# Patient Record
Sex: FEMALE | Race: WHITE | NOT HISPANIC OR LATINO | Employment: FULL TIME | URBAN - METROPOLITAN AREA
[De-identification: names, ages, dates, MRNs, and addresses within clinical notes are randomized per-mention and may not be internally consistent; named-entity substitution may affect disease eponyms.]

---

## 2019-03-26 ENCOUNTER — TELEPHONE (OUTPATIENT)
Dept: PEDIATRICS | Facility: CLINIC | Age: 29
End: 2019-03-26

## 2019-03-27 NOTE — TELEPHONE ENCOUNTER
Left message for patient to return my call directly to schedule meet and greet with Yadi. [phone] Magaly Sandra TC

## 2019-03-27 NOTE — TELEPHONE ENCOUNTER
Reason for call:  Other   Patient called regarding (reason for call): appointment  Additional comments: Does NAYELY Cline do meet and greets? See the online appointment request:    Other reason for visit:  We would like to see you for a pediatric meet & greet. Our baby girl will be here soon and would like to see if you would be a good fit for our future child.    Preferred doctor/provider HERBERTH Gonzalez, APRN CN   Preferred clinic [facility]   Preferred appointment day/time:  Monday, April 1st in the morning (anytime 9:00-11:00)         Phone number to reach patient:  Cell number on file:    Telephone Information:   Mobile [phone]       Best Time:  NA    Can we leave a detailed message on this number?  Not Applicable     Juana LOREDO  Central Scheduler

## 2019-04-01 ENCOUNTER — OFFICE VISIT (OUTPATIENT)
Dept: PEDIATRICS | Facility: CLINIC | Age: 29
End: 2019-04-01
Payer: COMMERCIAL

## 2019-04-01 DIAGNOSIS — Z3A.37 37 WEEKS GESTATION OF PREGNANCY: Primary | ICD-10-CM

## 2019-04-01 PROBLEM — E66.3 OVERWEIGHT: Status: ACTIVE | Noted: 2018-01-09

## 2019-04-01 PROCEDURE — 99207 ZZC NO CHARGE LOS: CPT | Performed by: NURSE PRACTITIONER

## 2019-04-01 NOTE — PATIENT INSTRUCTIONS
[facility]- Pediatric Department    If you have any questions regarding to your visit please contact:   Team Estelle:   Clinic Hours Telephone Number   JIM Castle, TASHI Rhoades PA-C, MS Lena Pisano, KELL Sandra,    7am - 7pm Mon - Thurs  7am - 5pm Fri [phone]    After hours and weekends, call [phone]   To make an appointment at any location anytime, please call [phone] or  [employer].   Pediatric Walk-in Clinic* 8:30am - 3pm  Mon- Fri    [facility]   8:00am - 7pm  Mon- Thurs  8:00am - 5:30 pm Friday  9am - 1pm Saturday [phone]   Urgent Care - [place]      Urgent Care - [place]       11pm-9pm Monday - Friday   9am-5pm Saturday - Sunday    5pm-9pm Monday - Friday  9am-5pm Saturday - Sunday [phone] - [place]      [phone] - [place]   *Pediatric Walk-In Clinic is available for children/adolescents age 0-21 for the following symptoms:  Cough/Cold symptoms   Rashes/Itchy Skin  Sore throat    Urinary tract infection  Diarrhea    Ringworm  Ear pain    Sinus infection  Fever     Pink eye       If your provider has ordered a CT, MRI, or ultrasound for you, please call to schedule:  [facility], phone [phone]  [facility] radiology, [phone]  [facility], phone [phone]    If you need a medication refill please contact your pharmacy.   Please allow 3 business days for your refills to be completed.  **For ADHD medication, patient will need a follow up clinic or Evisit at least every 3 months to obtain refills.**    Use Eligible (secure email communication and access to your chart) to send your primary care provider a message or make an appointment.  Ask someone on your Team how to sign up for Eligible or call the Eligible help line at [phone]  To view your child's test results online: Log into your own [facility]  "account, select your child's name from the tabs on the right hand side, select \"My medical record\" and select \"Test results\"  Do you have options for a visit without coming into the clinic?  [facility] offers electronic visits (E-visits) and telephone visits for certain medical concerns as well as Zipnosis online.    E-visits via [employer]- generally incur a $45.00 fee  Telephone visits- These are billed based on time spent (in 10-minute increments) on the phone with your provider.   5-10 minutes $30.00 fee   11-20 minutes $59.00 fee   21-30 minutes $85.00 fee  Zipnosis- $25.00 fee.  More information and link available on [email or web address] homepage.     "

## 2019-04-01 NOTE — PROGRESS NOTES
SUBJECTIVE:   Natalie Barreto is a 28 year old female who presents to clinic today with self because of:    Chief Complaint   Patient presents with     Establish Care     DUE DATE  [facility]      Natalie Barreto is a 28 year old female who is due with her first child tonight.  Her pregnancy is so far complicated by pre-eclampsia and she is planning to deliver at [place].She will be induced as she has had pre-eclampsia.  Mom is on BP meds but otherwise feels fine.  Her wife is Radha.   Mom is at 37.5 weeks.  Mom is planning to breast feed.  She will try to nurse for the first year if all goes well.  She has taken a class at the hospital on breast feeding.   Questions answered regarding usual schedule of visits, how to schedule appointments, triage system for questions/concerns, and call system for concerns outside of business hours.        ASSESSMENT/PLAN:   (Z3A.37) 37 weeks gestation of pregnancy  (primary encounter diagnosis)  Comment:   Plan:   Meet and great and discussed breast feedings, well child check, after hours and clinic hours.    FOLLOW UP: after birth of baby with     Yadi Dolan, PNP, APRN CNP

## 2019-04-19 ENCOUNTER — HEALTH MAINTENANCE LETTER (OUTPATIENT)
Age: 29
End: 2019-04-19

## 2020-03-11 ENCOUNTER — HEALTH MAINTENANCE LETTER (OUTPATIENT)
Age: 30
End: 2020-03-11

## 2021-01-03 ENCOUNTER — HEALTH MAINTENANCE LETTER (OUTPATIENT)
Age: 31
End: 2021-01-03

## 2021-03-20 ENCOUNTER — OFFICE VISIT (OUTPATIENT)
Dept: URGENT CARE | Facility: URGENT CARE | Age: 31
End: 2021-03-20
Payer: COMMERCIAL

## 2021-03-20 VITALS — TEMPERATURE: 98.2 F | HEART RATE: 87 BPM | OXYGEN SATURATION: 98 % | WEIGHT: 280 LBS | RESPIRATION RATE: 16 BRPM

## 2021-03-20 DIAGNOSIS — L03.115 CELLULITIS OF RIGHT LOWER EXTREMITY: ICD-10-CM

## 2021-03-20 DIAGNOSIS — L55.9 SUNBURN: Primary | ICD-10-CM

## 2021-03-20 PROCEDURE — 99204 OFFICE O/P NEW MOD 45 MIN: CPT | Performed by: STUDENT IN AN ORGANIZED HEALTH CARE EDUCATION/TRAINING PROGRAM

## 2021-03-20 RX ORDER — MUPIROCIN 20 MG/G
OINTMENT TOPICAL 3 TIMES DAILY
Qty: 22 G | Refills: 0 | Status: SHIPPED | OUTPATIENT
Start: 2021-03-20 | End: 2023-03-15

## 2021-03-20 RX ORDER — CEPHALEXIN 500 MG/1
500 CAPSULE ORAL 4 TIMES DAILY
Qty: 28 CAPSULE | Refills: 0 | Status: SHIPPED | OUTPATIENT
Start: 2021-03-20 | End: 2021-03-27

## 2021-03-20 RX ORDER — IBUPROFEN 800 MG/1
800 TABLET, FILM COATED ORAL EVERY 8 HOURS PRN
Qty: 30 TABLET | Refills: 0 | Status: SHIPPED | OUTPATIENT
Start: 2021-03-20 | End: 2023-03-15

## 2021-03-20 RX ORDER — FLUOCINOLONE ACETONIDE 0.25 MG/G
CREAM TOPICAL 2 TIMES DAILY
Qty: 60 G | Refills: 0 | Status: SHIPPED | OUTPATIENT
Start: 2021-03-20 | End: 2021-04-03

## 2021-03-20 NOTE — PROGRESS NOTES
Assessment & Plan     Sunburn  Mild/moderate sunburn on Star 3/13 with bilateral UE, back and bilateral LE papules with surrounding erythema, itching. Tried OTC steroid cream. No blisters.   Mupirocin if blisters occur, and encouraged close follow up with PCP or return to .   Ibuprofen as needed.   Synalar cream twice daily for 2 weeks for pruritis areas  AVS on sunburns provided. Reviewed reasons to seek ED care.   Stay well hydrated.  - mupirocin (BACTROBAN) 2 % external ointment; Apply topically 3 times daily On any open sores  - ibuprofen (ADVIL/MOTRIN) 800 MG tablet; Take 1 tablet (800 mg) by mouth every 8 hours as needed for moderate pain Take with food.  - fluocinolone (SYNALAR) 0.025 % cream; Apply topically 2 times daily for 14 days Do not use on face.    Cellulitis of right lower extremity  RLE with mild induration, ttp, warmth and size increase within the past 48 hours. Likely super infected from sunburn. No allergies.  Follow up with PCP in 1 week.   Thought about SJS, fascitis, compartment syndrome, anaphylaxis - all less likely due to timeline and presenting symptoms, HDS, and exam listed below.   - cephALEXin (KEFLEX) 500 MG capsule; Take 1 capsule (500 mg) by mouth 4 times daily for 7 days    Return in about 1 week (around 3/27/2021) for in person visit.    Geovanni Abbott MD  [facility]    Francy Estrada is a 30 year old who presents for the following health issues   HPI     Was in [place] - sunburnt badly Sunday 1 week ago. In between Sun- Wed, patient wore long sleeves at the beach. Left [place] Wed.   Itching and hive type rash showed up Tuesday  Itchy painful especially on legs   Forearms had some blisters that have gone away  Aloe and cortisone cream do not help with itching  Tried to keep up with sunscreen at [facility]   Chills on Sunday night, but none since. No fever.   No weakness  No dizziness  No shortness of breath, no chest pain  No open  wounds now  No numbness or tingling  No new meds  No new exposures  Drinking lots of water  RLE spot has grown within the past 48 hours.  Saw virtual provider last week for sunburn and they encouraged otc steroid cream    Review of Systems   See hpi      Objective    Pulse 87   Temp 98.2  F (36.8  C) (Oral)   Resp 16   Wt 127 kg (280 lb)   LMP 02/20/2021 (Approximate)   SpO2 98%   There is no height or weight on file to calculate BMI. not tachycardic.   Physical Exam   GENERAL: healthy, alert and no distress  EYES: Eyes grossly normal to inspection, conjunctivae and sclerae normal  RESP: lungs clear to auscultation. Normal effort, no distress   SKIN: bilateral UE diffuse papular rash with mild erythema, no induration, no blistering  Back - peeling from recent sunburn, no blisters  Bilateral LE: see picture below.  Induration noted on right LE anteriorly with mild ttp, no fluctuance, warm to the touch  PSYCH: mentation appears normal, affect normal/bright

## 2021-03-20 NOTE — PATIENT INSTRUCTIONS
Patient Education     Drink plenty of water  Take antibiotics for cellulitis   Use steroid cream on itchy areas as needed  Ibuprofen for the next 48hours or so  Stay out of the sun for the next week or so.   Use at least 30 spf reapply every hour  Follow up within 1 week with PCP    Sunburn  A sunburn is an injury to the skin. It is caused by over-exposure to ultraviolet (UV) light from the sun. The skin becomes pink or red and painful. Very severe sunburns may cause blistering and fluid draining from the skin. Open blisters may become infected. Watch for signs of infection. These include worsening pain, redness, swelling, or pus draining from the burn.   Sunburn starts to get better after 1 to 2 days. A few days later the skin begins to peel. It may take up to 3 weeks to fully heal. This depends on how severe the burn is.   Home care  The following guidelines will help you care for your sunburn at home:    Place an ice pack over the injured area.  Do this for 20 minutes every 1 to 2 hours the first day for pain relief. To make an ice pack, put ice cubes in a plastic bag that seals at the top. Wrap the bag in a clean, thin towel or cloth. Never put ice or an ice pack directly on your skin. This can cause damage. You can keep using an ice pack at least 3 to 4 times a day until the pain goes away. Cool baths and showers will also help with pain relief.    If you have blisters, don't break them. Open blisters slow the healing process. They also raise your risk of infection. You can cover the open blisters with antibacterial cream or ointment, and a dressing or bandage.    Wash the burned area daily with soap and water. Then gently dry it with a clean towel. If a dressing was used, put a clean one back on until any open blisters dry up. If the bandage sticks, soak it off in warm water. You can also use nonstick dressings to help prevent this from happening.    Drink plenty of fluids.  This is to prevent fluid loss  (dehydration).  Medicine    You can take over-the-counter medicine for pain, unless you were given a different pain medicine to use. Talk with your provider before using these medicines if you have chronic liver or kidney disease, ever had a stomach ulcer or GI bleeding, or are taking blood thinner medicines. Don't give ibuprofen to children younger than 6 months.    Over-the-counter first-aid creams and sprays made with lidocaine or benzocaine can also help with pain. But some people are sensitive to medicines that have these ingredients. If the redness or itching gets worse, stop using these medicines. You can use aloe or a moisturizing cream with aloe, or hydrocortisone cream (sold over the counter) to help with pain and swelling. Use these only over spots that don t have broken blisters.    Antibiotics are often not given unless there is an infection. If you were prescribed antibiotics, take them until they are finished. It's important to finish the antibiotics even if the burn looks better. This will ensure the infection has cleared.  Prevention  Sun exposure damages the DNA of skin cells. This can lead to premature skin aging and wrinkles. Sun exposure is the main cause of skin cancer. Protect your skin from the sun by following these tips:     Limit your exposure to UV light. The sun is strongest from 10 a.m. to 2 p.m. If possible, arrange your sun exposure to be before or after those hours. The sun is more intense at the beach, where light reflects off the sand and water. The sun is also more intense at higher altitudes, especially where there is reflecting snow. You can even get sunburn on a cloudy day, because UV light passes through clouds.    Don't use tanning beds.    Wear protective clothing and a hat. Clothing is more effective than sunscreen alone in blocking UV light.    Stay in the shade or carry an umbrella.    Use sunscreen on your skin. Put sunscreen on 15 to 20 minutes before going out in the  sun. This gives the sunscreen time to interact with your skin. Reapply sunscreen every 1 to 2 hours. Reapply it sooner if it is washed away by sweat or water. Use a sunscreen rated at SPF 30 or higher.    Wear sunglasses to protect your eyes from UV exposure.    Many medicines can increase your sensitivity to the sun. These include heart, nausea, anti-inflammatory, and diabetic medicines. It also includes antibiotics and diuretics. Check medicine fact sheets. Talk with your provider if you have questions about your increased risk of sun sensitivity. Sunscreens may not prevent this.   Follow-up care  Sunburn often heals without any problems. Follow up with your provider if your sunburn is not healing with home treatments. Also see your provider if you have signs and symptoms of infection.   When to get medical advice  Call your healthcare provider right away if any of these occur:    Pain that gets worse    Increasing redness, or red streaks leading away from an open blister    Swelling or pus coming from open blisters    Upset stomach (nausea)    Fever of 100.4  F (38  C) or higher, or as directed by your healthcare provider  Call 911  Call 911 if you have:     Dizziness, fainting, or weakness  [employer] last reviewed this educational content on 6/1/2020 2000-2020 [employer]. All rights reserved. This information is not intended as a substitute for professional medical care. Always follow your healthcare professional's instructions.

## 2021-03-22 ENCOUNTER — TELEPHONE (OUTPATIENT)
Dept: FAMILY MEDICINE | Facility: CLINIC | Age: 31
End: 2021-03-22

## 2021-03-22 NOTE — TELEPHONE ENCOUNTER
Patient was seen in Urgent Care for Cellulitis of lower extremity.  Patient was prescribe Cephalexin.  Patient reports the redness is spreading and is overall worsening.  Advise will need repeat evaluation.  Advise, emergency department, potentially for IV antibiotic or parental.  If patient ops to go to Urgent Care may need emergency department visit as well.   Patient has only ever been to [facility] for Urgent Care.   Verbalized good understanding.   Aranza Vásquez RN

## 2021-04-25 ENCOUNTER — HEALTH MAINTENANCE LETTER (OUTPATIENT)
Age: 31
End: 2021-04-25

## 2021-08-24 ENCOUNTER — TRANSFERRED RECORDS (OUTPATIENT)
Dept: HEALTH INFORMATION MANAGEMENT | Facility: CLINIC | Age: 31
End: 2021-08-24
Payer: COMMERCIAL

## 2021-08-24 LAB
ALT SERPL-CCNC: 29 IU/L (ref 12–68)
AST SERPL-CCNC: 15 IU/L (ref 12–37)
CREATININE (EXTERNAL): 0.6 MG/DL (ref 0.55–1.02)
GFR ESTIMATED (EXTERNAL): >60 ML/MIN
GFR ESTIMATED (IF AFRICAN AMERICAN) (EXTERNAL): >60 ML/MIN
GLUCOSE (EXTERNAL): 71 MG/DL (ref 74–106)
HEP C HIM: NORMAL
HIV 1&2 EXT: NORMAL
POTASSIUM (EXTERNAL): 3.8 MMOL/L (ref 3.5–5.1)

## 2021-10-10 ENCOUNTER — HEALTH MAINTENANCE LETTER (OUTPATIENT)
Age: 31
End: 2021-10-10

## 2022-04-01 ENCOUNTER — MEDICAL CORRESPONDENCE (OUTPATIENT)
Dept: HEALTH INFORMATION MANAGEMENT | Facility: CLINIC | Age: 32
End: 2022-04-01
Payer: COMMERCIAL

## 2022-05-02 ENCOUNTER — MEDICAL CORRESPONDENCE (OUTPATIENT)
Dept: HEALTH INFORMATION MANAGEMENT | Facility: CLINIC | Age: 32
End: 2022-05-02
Payer: COMMERCIAL

## 2022-05-21 ENCOUNTER — HEALTH MAINTENANCE LETTER (OUTPATIENT)
Age: 32
End: 2022-05-21

## 2022-07-05 ENCOUNTER — MEDICAL CORRESPONDENCE (OUTPATIENT)
Dept: URGENT CARE | Facility: URGENT CARE | Age: 32
End: 2022-07-05

## 2022-09-18 ENCOUNTER — HEALTH MAINTENANCE LETTER (OUTPATIENT)
Age: 32
End: 2022-09-18

## 2023-03-15 ENCOUNTER — VIRTUAL VISIT (OUTPATIENT)
Dept: FAMILY MEDICINE | Facility: OTHER | Age: 33
End: 2023-03-15
Payer: COMMERCIAL

## 2023-03-15 DIAGNOSIS — Z20.818 STREP THROAT EXPOSURE: Primary | ICD-10-CM

## 2023-03-15 PROCEDURE — 99213 OFFICE O/P EST LOW 20 MIN: CPT | Mod: VID | Performed by: PHYSICIAN ASSISTANT

## 2023-03-15 RX ORDER — AMOXICILLIN 500 MG/1
500 CAPSULE ORAL 2 TIMES DAILY
Qty: 14 CAPSULE | Refills: 0 | Status: SHIPPED | OUTPATIENT
Start: 2023-03-15 | End: 2023-03-22

## 2023-03-15 ASSESSMENT — ENCOUNTER SYMPTOMS: SORE THROAT: 1

## 2023-03-15 NOTE — PROGRESS NOTES
Natalie is a 32 year old who is being evaluated via a billable video visit.      How would you like to obtain your AVS? MyChart  If the video visit is dropped, the invitation should be resent by: Text to cell phone: [phone]  Will anyone else be joining your video visit? No      Assessment & Plan     ICD-10-CM    1. Strep throat exposure  Z20.818 amoxicillin (AMOXIL) 500 MG capsule        - Patient with sudden onset of sore throat and various other symptoms last night     Has 2 kids at home with strep   - Patient meets [facility] criteria for treatment      Did discuss risks of treatment without testing, she would like to proceed with antibiotics   - Will treat with Amoxicillin  - Discussed antibiotic use, duration, and side effects  - Encouraged rest and fluids, continue over the counter medications if helpful   - Hand out given     Review of the result(s) of each unique test - None    Diagnosis or treatment significantly limited by social determinants of health - None     16 minutes spent on the date of the encounter doing chart review, history and exam, documentation and further activities as noted above    The patient indicates understanding of these issues and agrees with the plan.    Return in about 1 week (around 3/22/2023) for if not improving.    REILLY Salazar [facility]   Natalie is a 32 year old, presenting for the following health issues:  Pharyngitis (Strep Concern)      Pharyngitis     History of Present Illness       Reason for visit:  Strep throat  Symptom onset:  1-3 days ago  Symptoms include:  Soar throat, chills, muscle aches  Symptom intensity:  Moderate  Symptom progression:  Worsening  Had these symptoms before:  No  What makes it better:  Sleeping    She eats 2-3 servings of fruits and vegetables daily.She consumes 0 sweetened beverage(s) daily.She exercises with enough effort to increase her heart rate 30 to 60 minutes per  day.  She exercises with enough effort to increase her heart rate 4 days per week. She is missing 1 dose(s) of medications per week.  She is not taking prescribed medications regularly due to remembering to take.       Acute Illness  Acute illness concerns: 2 kids with strep   Onset/Duration: last night about 4 pm   Symptoms:  Fever: No  Chills/Sweats: YES- last night and this AM   Headache (location?): No  Sinus Pressure: YES, in frontal area   Conjunctivitis:  No  Ear Pain: no  Rhinorrhea: YES  Congestion: YES  Sore Throat: YES  Cough: no  Wheeze: No  Decreased Appetite: YES- hurts to swallow   Nausea: YES  Vomiting: No  Diarrhea: No  Fatigue/Achiness: YES  Sick/Strep Exposure: YES  Therapies tried and outcome: Tylenol and resting and fluids - minimally     - Wife checked throat and white dots and red   - Feels that lymph nodes might be swollen      - Covid test last night negative         Review of Systems   HENT: Positive for sore throat.     Constitutional, HEENT, cardiovascular, pulmonary, gi and gu systems are negative, except as otherwise noted.      Objective       Vitals:  No vitals were obtained today due to virtual visit.    Physical Exam   GENERAL: Healthy, alert and no distress  EYES: Eyes grossly normal to inspection.  No discharge or erythema, or obvious scleral/conjunctival abnormalities.  RESP: No audible wheeze, cough, or visible cyanosis.  No visible retractions or increased work of breathing.    SKIN: Visible skin clear. No significant rash, abnormal pigmentation or lesions.  NEURO: Cranial nerves grossly intact.  Mentation and speech appropriate for age.  PSYCH: Mentation appears normal, affect normal/bright, judgement and insight intact, normal speech and appearance well-groomed.    Diagnostics: see orders pending       Video-Visit Details    Type of service:  Video Visit   Originating Location (pt. Location): Home  Distant Location (provider location):  Off-site  Platform used for Video  Visit: Basim

## 2023-04-05 ENCOUNTER — OFFICE VISIT (OUTPATIENT)
Dept: URGENT CARE | Facility: URGENT CARE | Age: 33
End: 2023-04-05
Payer: COMMERCIAL

## 2023-04-05 VITALS
TEMPERATURE: 97.9 F | HEART RATE: 92 BPM | OXYGEN SATURATION: 97 % | RESPIRATION RATE: 16 BRPM | SYSTOLIC BLOOD PRESSURE: 130 MMHG | DIASTOLIC BLOOD PRESSURE: 87 MMHG | WEIGHT: 293 LBS

## 2023-04-05 DIAGNOSIS — H66.001 ACUTE SUPPURATIVE OTITIS MEDIA OF RIGHT EAR WITHOUT SPONTANEOUS RUPTURE OF TYMPANIC MEMBRANE, RECURRENCE NOT SPECIFIED: ICD-10-CM

## 2023-04-05 DIAGNOSIS — J02.9 ACUTE PHARYNGITIS, UNSPECIFIED ETIOLOGY: Primary | ICD-10-CM

## 2023-04-05 PROCEDURE — 99213 OFFICE O/P EST LOW 20 MIN: CPT | Performed by: FAMILY MEDICINE

## 2023-04-05 RX ORDER — AMOXICILLIN 500 MG/1
1000 CAPSULE ORAL 2 TIMES DAILY
Qty: 40 CAPSULE | Refills: 0 | Status: SHIPPED | OUTPATIENT
Start: 2023-04-05 | End: 2023-04-15

## 2023-04-05 RX ORDER — SERTRALINE HYDROCHLORIDE 100 MG/1
150 TABLET, FILM COATED ORAL DAILY
COMMUNITY
Start: 2022-11-16

## 2023-04-05 NOTE — PROGRESS NOTES
Chief complaint: possible recurrent strep    2 weeks ago had strep in their household  Patient started having symptoms and saw primary care provider. Patient was empirically treated with amoxicillin 500mg po bid for 7 days   Patient got better within 24 hours.    Her 2 yo currently has some congestion    Sore throat came back yesterday  Was just a little scratchy  Hurts to swallow   Congestion and runny nose  No fevers  No cough    Patient plans to do a home covid test  able to swallow liquids and solids -YES  other symptoms above  Rash: No  Has tried over the counter medications no relief  because of persistence, patient came in to be seen.    ROS:  denies any exertional chest pain or shortness of breath  denies any unusual rash or joint swelling  denies post-tussive emesis or pertussis like symptoms  Negative for constitutional, eye, ear, nose, throat, skin, respiratory, cardiac, and gastrointestinal other than those outlined in the HPI.    PMH: chart reviewed  FH: chart reviewed    SH: chart reviewed and as above   Physical Exam:   /87   Pulse 92   Temp 97.9  F (36.6  C) (Tympanic)   Resp 16   Wt 138.6 kg (305 lb 9.6 oz)   SpO2 97%   Breastfeeding Yes   General : Awake Alert not in any acute cardiorespiratory distress  Head:       Normocephalic Atraumatic  Eyes:    Pupils equally reactive to light and accomodation. Sclera not icteric.   ENT:   midline nasal septum, mild nasal congestion, sinuses non-tender  left ear: no tragal tenderness, no mastoid tenderness, normal EAC, normal TM  right ear: left ear: no tragal tenderness, no mastoid tenderness, normal EAC, erythematous tympaninc membrane with effusion  mouth moist buccal mucosa, Yes hyperemic posterior pharyngeal wall, no trismus  tonsils: bilateral tonsil abnormal with erythematous grade 2 no exudate  anterior cervical nodes: Yes tender  posterior cervical nodes: No  palpable  Heart:  Regular in rate and rhythm, no murmurs rubs or gallops  Lungs:  Symmetrical Chest Expansion, no retractions, clear breath sounds  Psych: Appropriate mood and affect. Pleasant  Skin: patient undressed to level of his/her comfort. No visible concerning lesions.    Labs:     ICD-10-CM    1. Acute pharyngitis, unspecified etiology  J02.9 amoxicillin (AMOXIL) 500 MG capsule      2. Acute suppurative otitis media of right ear without spontaneous rupture of tympanic membrane, recurrence not specified  H66.001 amoxicillin (AMOXIL) 500 MG capsule        Prescribed with amoxicillin   supportive treatment: advised supportive treatment, Advised to come back in if with any worsening symptoms or if not better despite supportive measures.  adverse reactions of medication discussed  advised to come back in right away if with any worsening symptoms or if with no relief despite treatment plan  patient voiced understanding and had no further questions at this time.    Ivelisse Butler M.D.

## 2023-06-04 ENCOUNTER — HEALTH MAINTENANCE LETTER (OUTPATIENT)
Age: 33
End: 2023-06-04

## 2023-11-06 ENCOUNTER — APPOINTMENT (OUTPATIENT)
Dept: URBAN - METROPOLITAN AREA CLINIC 252 | Age: 33
Setting detail: DERMATOLOGY
End: 2023-11-07

## 2023-11-06 VITALS — HEIGHT: 64 IN | RESPIRATION RATE: 18 BRPM | WEIGHT: 293 LBS

## 2023-11-06 DIAGNOSIS — Z71.89 OTHER SPECIFIED COUNSELING: ICD-10-CM

## 2023-11-06 DIAGNOSIS — D22 MELANOCYTIC NEVI: ICD-10-CM

## 2023-11-06 DIAGNOSIS — L57.8 OTHER SKIN CHANGES DUE TO CHRONIC EXPOSURE TO NONIONIZING RADIATION: ICD-10-CM

## 2023-11-06 PROBLEM — D22.5 MELANOCYTIC NEVI OF TRUNK: Status: ACTIVE | Noted: 2023-11-06

## 2023-11-06 PROBLEM — D22.4 MELANOCYTIC NEVI OF SCALP AND NECK: Status: ACTIVE | Noted: 2023-11-06

## 2023-11-06 PROCEDURE — 99203 OFFICE O/P NEW LOW 30 MIN: CPT

## 2023-11-06 PROCEDURE — OTHER COUNSELING: OTHER

## 2023-11-06 ASSESSMENT — LOCATION DETAILED DESCRIPTION DERM
LOCATION DETAILED: RIGHT INFERIOR LATERAL NECK
LOCATION DETAILED: RIGHT LATERAL SUPERIOR CHEST
LOCATION DETAILED: LEFT POSTERIOR SHOULDER
LOCATION DETAILED: RIGHT POSTERIOR SHOULDER
LOCATION DETAILED: LEFT MID-UPPER BACK
LOCATION DETAILED: RIGHT MID-UPPER BACK

## 2023-11-06 ASSESSMENT — LOCATION ZONE DERM
LOCATION ZONE: ARM
LOCATION ZONE: NECK
LOCATION ZONE: TRUNK

## 2023-11-06 ASSESSMENT — LOCATION SIMPLE DESCRIPTION DERM
LOCATION SIMPLE: LEFT UPPER BACK
LOCATION SIMPLE: RIGHT ANTERIOR NECK
LOCATION SIMPLE: LEFT SHOULDER
LOCATION SIMPLE: CHEST
LOCATION SIMPLE: RIGHT UPPER BACK
LOCATION SIMPLE: RIGHT SHOULDER

## 2023-11-06 NOTE — PROCEDURE: COUNSELING
Patient Specific Counseling (Will Not Stick From Patient To Patient): ****History of reaction to sunscreen in the past, gave samples of the la alejandra posay, cerave, vanicream SPF and Eucerin SPF lines to try-advised to try in a small area on her body first as a test area.
Detail Level: Generalized
Detail Level: Zone

## 2024-06-16 ENCOUNTER — E-VISIT (OUTPATIENT)
Dept: URGENT CARE | Facility: CLINIC | Age: 34
End: 2024-06-16
Payer: COMMERCIAL

## 2024-06-16 DIAGNOSIS — R21 RASH AND NONSPECIFIC SKIN ERUPTION: Primary | ICD-10-CM

## 2024-06-16 PROCEDURE — 99207 PR NON-BILLABLE SERV PER CHARTING: CPT | Performed by: FAMILY MEDICINE

## 2024-06-16 NOTE — PATIENT INSTRUCTIONS
Dear Natalie Barreto,    We are sorry you are not feeling well. Based on the responses you provided, it is recommended that you be seen in-person in urgent care so we can better evaluate your symptoms. Please click here to find the nearest urgent care location to you.   You will not be charged for this Visit. Thank you for trusting us with your care.    Alayna Love MD

## 2024-06-18 ENCOUNTER — APPOINTMENT (OUTPATIENT)
Dept: URBAN - METROPOLITAN AREA CLINIC 252 | Age: 34
Setting detail: DERMATOLOGY
End: 2024-06-23

## 2024-06-18 DIAGNOSIS — L56.8 OTHER SPECIFIED ACUTE SKIN CHANGES DUE TO ULTRAVIOLET RADIATION: ICD-10-CM

## 2024-06-18 PROCEDURE — OTHER PRESCRIPTION: OTHER

## 2024-06-18 PROCEDURE — OTHER COUNSELING: OTHER

## 2024-06-18 PROCEDURE — 99213 OFFICE O/P EST LOW 20 MIN: CPT

## 2024-06-18 RX ORDER — TRIAMCINOLONE ACETONIDE 1 MG/G
0.1% CREAM TOPICAL BID
Qty: 454 | Refills: 0 | Status: ERX | COMMUNITY
Start: 2024-06-18

## 2024-06-18 RX ORDER — METHYLPREDNISOLONE 4 MG/1
4MG TABLET ORAL AS DIRECTED
Qty: 1 | Refills: 0 | Status: ERX | COMMUNITY
Start: 2024-06-18

## 2024-06-18 ASSESSMENT — LOCATION ZONE DERM
LOCATION ZONE: LEG
LOCATION ZONE: ARM

## 2024-06-18 ASSESSMENT — LOCATION SIMPLE DESCRIPTION DERM
LOCATION SIMPLE: RIGHT POSTERIOR UPPER ARM
LOCATION SIMPLE: LEFT THIGH
LOCATION SIMPLE: RIGHT THIGH
LOCATION SIMPLE: LEFT PRETIBIAL REGION
LOCATION SIMPLE: LEFT FOREARM
LOCATION SIMPLE: RIGHT FOREARM
LOCATION SIMPLE: RIGHT PRETIBIAL REGION

## 2024-06-18 ASSESSMENT — LOCATION DETAILED DESCRIPTION DERM
LOCATION DETAILED: RIGHT PROXIMAL DORSAL FOREARM
LOCATION DETAILED: LEFT PROXIMAL PRETIBIAL REGION
LOCATION DETAILED: RIGHT DISTAL POSTERIOR UPPER ARM
LOCATION DETAILED: LEFT ANTERIOR DISTAL THIGH
LOCATION DETAILED: LEFT PROXIMAL DORSAL FOREARM
LOCATION DETAILED: RIGHT PROXIMAL PRETIBIAL REGION
LOCATION DETAILED: RIGHT ANTERIOR DISTAL THIGH

## 2024-06-18 NOTE — HPI: RASH
What Type Of Note Output Would You Prefer (Optional)?: Bullet Format
Is The Patient Presenting As Previously Scheduled?: No, they are coming in before their scheduled appointment
How Severe Is Your Rash?: moderate
Is This A New Presentation, Or A Follow-Up?: Rash
Additional History: Patient has history of photodermatitis, thinks that’s  what she has now given it started 2 days after tropical vacation. \\nGiven triamcinolone. Has had oral prednisone in the past for this, wonders if she should have the oral prescribed again for this flare.  Denies fever, chills \\nWondering about better preventative options, wears SPF and sun clothes.

## 2024-07-14 ENCOUNTER — HEALTH MAINTENANCE LETTER (OUTPATIENT)
Age: 34
End: 2024-07-14

## 2024-08-11 ENCOUNTER — ANCILLARY PROCEDURE (OUTPATIENT)
Dept: GENERAL RADIOLOGY | Facility: CLINIC | Age: 34
End: 2024-08-11
Attending: FAMILY MEDICINE
Payer: COMMERCIAL

## 2024-08-11 ENCOUNTER — TELEPHONE (OUTPATIENT)
Dept: FAMILY MEDICINE | Facility: CLINIC | Age: 34
End: 2024-08-11

## 2024-08-11 ENCOUNTER — OFFICE VISIT (OUTPATIENT)
Dept: URGENT CARE | Facility: URGENT CARE | Age: 34
End: 2024-08-11
Payer: COMMERCIAL

## 2024-08-11 VITALS
WEIGHT: 293 LBS | DIASTOLIC BLOOD PRESSURE: 88 MMHG | SYSTOLIC BLOOD PRESSURE: 140 MMHG | TEMPERATURE: 99.4 F | HEART RATE: 87 BPM | RESPIRATION RATE: 24 BRPM | OXYGEN SATURATION: 96 %

## 2024-08-11 DIAGNOSIS — R05.2 SUBACUTE COUGH: ICD-10-CM

## 2024-08-11 DIAGNOSIS — R91.8 RIGHT LOWER LOBE PULMONARY INFILTRATE: Primary | ICD-10-CM

## 2024-08-11 PROCEDURE — 99214 OFFICE O/P EST MOD 30 MIN: CPT | Performed by: FAMILY MEDICINE

## 2024-08-11 PROCEDURE — 71046 X-RAY EXAM CHEST 2 VIEWS: CPT | Mod: TC | Performed by: RADIOLOGY

## 2024-08-11 RX ORDER — LISDEXAMFETAMINE DIMESYLATE 30 MG/1
30 CAPSULE ORAL EVERY MORNING
COMMUNITY
Start: 2024-07-14

## 2024-08-11 RX ORDER — ALBUTEROL SULFATE 90 UG/1
2 AEROSOL, METERED RESPIRATORY (INHALATION) EVERY 6 HOURS PRN
Qty: 18 G | Refills: 0 | Status: SHIPPED | OUTPATIENT
Start: 2024-08-11

## 2024-08-11 RX ORDER — DOXYCYCLINE 100 MG/1
100 CAPSULE ORAL 2 TIMES DAILY
Qty: 20 CAPSULE | Refills: 0 | Status: SHIPPED | OUTPATIENT
Start: 2024-08-11

## 2024-08-11 NOTE — PROGRESS NOTES
Assessment & Plan     (R91.8) Right lower lobe pulmonary infiltrate  (primary encounter diagnosis)  Comment:   Plan: doxycycline hyclate (VIBRAMYCIN) 100 MG         capsule, albuterol (PROAIR HFA/PROVENTIL         HFA/VENTOLIN HFA) 108 (90 Base) MCG/ACT inhaler            (R05.2) Subacute cough  Comment:   Plan: XR Chest 2 Views          Negative home COVID test.    Reviewed chest x-ray with patient which showed right lower lobe effusion, underlying infiltrate.  As advised with supportive care,  Started doxycycline, use albuterol inhaler as needed for cough.    Bonnie Chacon MD  [facility] [place]    Francy Estrada is a 34 year old female who presents to clinic today for the following health issues:  Chief Complaint   Patient presents with    Urgent Care     c/o cough, chest congestion, runny stuffy nose, sore throat, headache body aches and fatigue for one month.      Patient reports she has been having cough ,sinus congestion for over a month now.  She has been having body ache, low-grade fever.  Sometimes chest congestion, no wheezing, no history of asthma.  Cough more at night.    Patient had multiple negative COVID test at home.  No sick contact.  No diarrhea no vomiting.  She does report sinus congestion postnasal drainage.  She is not a smoker      Review of Systems  Constitutional, HEENT, cardiovascular, pulmonary, GI, , musculoskeletal, neuro, skin, endocrine and psych systems are negative, except as otherwise noted.      Objective    BP (!) 150/95   Pulse 87   Temp 99.4  F (37.4  C) (Tympanic)   Resp 24   Wt 146.1 kg (322 lb)   SpO2 96%   Physical Exam   GENERAL: alert and no distress  HENT: ear canals and TM's normal, nose and mouth without ulcers or lesions  NECK: no adenopathy, no asymmetry, masses, or scars  RESP: rhonchi throughout and expiratory wheezes throughout  CV: regular rate and rhythm, normal S1 S2, no S3 or S4, no murmur, click or rub, no peripheral  edema  ABDOMEN: soft, nontender, no hepatosplenomegaly, no masses and bowel sounds normal  MS: no gross musculoskeletal defects noted, no edema  SKIN: no suspicious lesions or rashes  PSYCH: mentation appears normal, affect normal/bright    CXR - Reviewed and interpreted by me : right lobe effusion and infiltrate.

## 2024-08-11 NOTE — TELEPHONE ENCOUNTER
Medication Question or Refill        What medication are you calling about (include dose and sig)?: MEDICATIONS PRESCRIBED TODAY AT [facility] VISIT    Preferred Pharmacy:   [id number] - [place], [place] - [address]  [address]  [place] [address]  Phone: [phone] Fax: [phone]    [facility][id number] - [place] - [address] AT [facility]  [id number] [place] [address]  [place] [address]  Phone: [phone] Fax: [phone]    [facility] #[id number] - [place], [address] AT [address]  [place] [facility] [address]  Phone: [phone] Fax: [phone]      Controlled Substance Agreement on file:   CSA -- Patient Level:    CSA: None found at the patient level.       Who prescribed the medication?: [facility]    Do you need a refill? No    When did you use the medication last? N/A    Patient offered an appointment? No    Do you have any questions or concerns?  Yes: PT SAYS THE MEDICATION PRESCRIBED TODAY AT THE [facility] WAS SENT TO A PHARMACY THAT IS CLOSED TODAY, WAS WONDERING IF IT CAN BE SENT TO THE [facility] ON [address] IN [place].       Could we send this information to you in [facility] or would you prefer to receive a phone call?:   Patient would prefer a phone call   Okay to leave a detailed message?: Yes at Cell number on file:    Telephone Information:   Mobile [phone]

## 2025-07-19 ENCOUNTER — HEALTH MAINTENANCE LETTER (OUTPATIENT)
Age: 35
End: 2025-07-19